# Patient Record
Sex: FEMALE | Race: WHITE
[De-identification: names, ages, dates, MRNs, and addresses within clinical notes are randomized per-mention and may not be internally consistent; named-entity substitution may affect disease eponyms.]

---

## 2020-06-17 ENCOUNTER — HOSPITAL ENCOUNTER (OUTPATIENT)
Dept: HOSPITAL 7 - FB.SDS | Age: 70
Discharge: HOME | End: 2020-06-17
Attending: SURGERY
Payer: MEDICARE

## 2020-06-17 VITALS — SYSTOLIC BLOOD PRESSURE: 122 MMHG | DIASTOLIC BLOOD PRESSURE: 70 MMHG | HEART RATE: 62 BPM

## 2020-06-17 DIAGNOSIS — Z98.890: ICD-10-CM

## 2020-06-17 DIAGNOSIS — K21.9: ICD-10-CM

## 2020-06-17 DIAGNOSIS — E78.49: ICD-10-CM

## 2020-06-17 DIAGNOSIS — Z11.59: ICD-10-CM

## 2020-06-17 DIAGNOSIS — Z79.899: ICD-10-CM

## 2020-06-17 DIAGNOSIS — I10: ICD-10-CM

## 2020-06-17 DIAGNOSIS — Z12.11: Primary | ICD-10-CM

## 2020-06-17 DIAGNOSIS — Z86.010: ICD-10-CM

## 2020-06-17 PROCEDURE — U0002 COVID-19 LAB TEST NON-CDC: HCPCS

## 2020-06-17 NOTE — OR
DATE OF OPERATION:  06/17/2020

 

SURGEON:  Ascencion Gold MD

 

PROCEDURE PERFORMED:  Colonoscopy.

 

PREOPERATIVE DIAGNOSIS:  Routine colon cancer screening.

 

POSTOPERATIVE DIAGNOSIS:  Normal exam.

 

INDICATIONS FOR PROCEDURE:  This is a 70-year-old white female who presents for

screening colonoscopy.  She was offered and accepted same.

 

DESCRIPTION OF OPERATION:  After an excellent IV sedation was administered,

digital rectal exam was performed.  No marked abnormality was noted.  Flexible

colonoscope was inserted and advanced to the cecum.  Prep was excellent.

Following findings were noted:  Ascending colon, unremarkable.  Transverse

colon, unremarkable.  Descending colon, unremarkable.  Sigmoid and rectum,

unremarkable.  The patient tolerated the procedure well.

 

RECOMMENDATIONS:  Given her age, a scope can be considered in 10 years.  Follow

up p.r.n.

 

Job#: [id number]

DD: 06/17/2020 0816

DT: 06/17/2020 1053 AS/FEMI

## 2020-06-17 NOTE — PCM.OPNOTE
- General Post-Op/Procedure Note


Date of Surgery/Procedure: 06/17/20


Operative Procedure(s): c scope


Findings: 





nl exam 


Pre Op Diagnosis: screening


Post-Op Diagnosis: nl exam


Anesthesia Technique: MAC


Primary Surgeon: Ascencion Gold


Anesthesia Provider: Miguel Akins


Pathology: 





none


Complications: None


Condition: Good


Free Text/Narrative:: 


see dictation

## 2022-09-13 ENCOUNTER — HOSPITAL ENCOUNTER (OUTPATIENT)
Dept: HOSPITAL 7 - FB.SDS | Age: 72
Discharge: HOME | End: 2022-09-13
Attending: OPHTHALMOLOGY
Payer: MEDICARE

## 2022-09-13 VITALS — HEART RATE: 65 BPM | DIASTOLIC BLOOD PRESSURE: 76 MMHG | SYSTOLIC BLOOD PRESSURE: 122 MMHG

## 2022-09-13 DIAGNOSIS — Z79.899: ICD-10-CM

## 2022-09-13 DIAGNOSIS — M35.3: ICD-10-CM

## 2022-09-13 DIAGNOSIS — Z98.890: ICD-10-CM

## 2022-09-13 DIAGNOSIS — I10: ICD-10-CM

## 2022-09-13 DIAGNOSIS — E66.9: ICD-10-CM

## 2022-09-13 DIAGNOSIS — H52.223: ICD-10-CM

## 2022-09-13 DIAGNOSIS — H52.202: ICD-10-CM

## 2022-09-13 DIAGNOSIS — E78.49: ICD-10-CM

## 2022-09-13 DIAGNOSIS — H04.123: ICD-10-CM

## 2022-09-13 DIAGNOSIS — H52.13: ICD-10-CM

## 2022-09-13 DIAGNOSIS — E78.5: ICD-10-CM

## 2022-09-13 DIAGNOSIS — H35.3131: ICD-10-CM

## 2022-09-13 DIAGNOSIS — H25.813: Primary | ICD-10-CM

## 2022-09-13 PROCEDURE — 00142 ANES PX ON EYE LENS SURGERY: CPT

## 2022-09-13 PROCEDURE — 66984 XCAPSL CTRC RMVL W/O ECP: CPT

## 2022-09-27 ENCOUNTER — HOSPITAL ENCOUNTER (OUTPATIENT)
Dept: HOSPITAL 7 - FB.SDS | Age: 72
Discharge: HOME | End: 2022-09-27
Attending: OPHTHALMOLOGY
Payer: MEDICARE

## 2022-09-27 VITALS — HEART RATE: 62 BPM | SYSTOLIC BLOOD PRESSURE: 103 MMHG | DIASTOLIC BLOOD PRESSURE: 57 MMHG

## 2022-09-27 DIAGNOSIS — M35.3: ICD-10-CM

## 2022-09-27 DIAGNOSIS — H52.223: ICD-10-CM

## 2022-09-27 DIAGNOSIS — E78.49: ICD-10-CM

## 2022-09-27 DIAGNOSIS — H35.3131: ICD-10-CM

## 2022-09-27 DIAGNOSIS — H04.123: ICD-10-CM

## 2022-09-27 DIAGNOSIS — E66.9: ICD-10-CM

## 2022-09-27 DIAGNOSIS — I10: ICD-10-CM

## 2022-09-27 DIAGNOSIS — H25.813: Primary | ICD-10-CM

## 2022-09-27 DIAGNOSIS — Z79.899: ICD-10-CM

## 2022-09-27 DIAGNOSIS — Z98.890: ICD-10-CM

## 2022-09-27 DIAGNOSIS — H52.13: ICD-10-CM
